# Patient Record
Sex: MALE | URBAN - METROPOLITAN AREA
[De-identification: names, ages, dates, MRNs, and addresses within clinical notes are randomized per-mention and may not be internally consistent; named-entity substitution may affect disease eponyms.]

---

## 2020-01-10 ENCOUNTER — OFFICE VISIT (OUTPATIENT)
Dept: URBAN - METROPOLITAN AREA CLINIC 33 | Facility: CLINIC | Age: 80
End: 2020-01-10
Payer: MEDICARE

## 2020-01-10 DIAGNOSIS — G45.3 AMAUROSIS FUGAX: Primary | ICD-10-CM

## 2020-01-10 DIAGNOSIS — H25.13 AGE-RELATED NUCLEAR CATARACT, BILATERAL: ICD-10-CM

## 2020-01-10 PROCEDURE — 99204 OFFICE O/P NEW MOD 45 MIN: CPT | Performed by: OPTOMETRIST

## 2020-01-10 ASSESSMENT — INTRAOCULAR PRESSURE
OS: 15
OD: 15

## 2020-01-10 ASSESSMENT — KERATOMETRY
OS: 42.88
OD: 42.63

## 2020-01-10 ASSESSMENT — VISUAL ACUITY
OD: 20/40
OS: 20/30

## 2020-01-10 NOTE — IMPRESSION/PLAN
Impression: Amaurosis fugax: G45.3. Plan: Patient experienced blacked out vision in left eye for 10 seconds 2 weeks ago and had complete vision recovery. Patient reports history of ocular migraines. Discussed findings with patient. No signs of retinal emboli or optic nerve edema. Discussed with patient to go to ED immediately if episode reoccurs or last lasts for longer periods of time. Patient seeing vascular specialist in 1 month and will discuss episode with specialist. Monitor at this time.

## 2020-01-16 ENCOUNTER — TESTING ONLY (OUTPATIENT)
Dept: URBAN - METROPOLITAN AREA CLINIC 33 | Facility: CLINIC | Age: 80
End: 2020-01-16
Payer: MEDICARE

## 2020-01-16 DIAGNOSIS — H52.4 PRESBYOPIA: ICD-10-CM

## 2020-01-16 PROCEDURE — V2799 MISC VISION ITEM OR SERVICE: HCPCS | Performed by: OPTOMETRIST

## 2020-01-16 ASSESSMENT — VISUAL ACUITY
OS: 20/25
OD: 20/40

## 2020-01-16 NOTE — IMPRESSION/PLAN
Impression: Age-related nuclear cataract, bilateral: H25.13. Plan: Cataracts account for the patient's complaints. Discussed all risks, benefits, procedures and recovery. Patient understands changing glasses will not improve vision. Patient desires to have surgery, recommend phacoemulsification with intraocular lens. Recommend cataract surgery when patient desires.